# Patient Record
Sex: FEMALE | Race: WHITE | NOT HISPANIC OR LATINO | Employment: OTHER | URBAN - METROPOLITAN AREA
[De-identification: names, ages, dates, MRNs, and addresses within clinical notes are randomized per-mention and may not be internally consistent; named-entity substitution may affect disease eponyms.]

---

## 2019-04-26 ENCOUNTER — HOSPITAL ENCOUNTER (OUTPATIENT)
Dept: GENERAL RADIOLOGY | Facility: HOSPITAL | Age: 84
Discharge: HOME | End: 2019-04-26
Attending: INTERNAL MEDICINE

## 2019-12-05 ENCOUNTER — HOSPITAL ENCOUNTER (OUTPATIENT)
Dept: GENERAL RADIOLOGY | Facility: HOSPITAL | Age: 84
Discharge: HOME | End: 2019-12-05
Attending: INTERNAL MEDICINE

## 2020-10-05 ENCOUNTER — HOSPITAL ENCOUNTER (OUTPATIENT)
Dept: GENERAL RADIOLOGY | Facility: HOSPITAL | Age: 85
Discharge: HOME | End: 2020-10-05
Attending: INTERNAL MEDICINE

## 2021-05-23 ENCOUNTER — TRANSCRIBE ORDERS (OUTPATIENT)
Dept: ADMINISTRATIVE | Facility: HOSPITAL | Age: 86
End: 2021-05-23

## 2021-05-23 DIAGNOSIS — K74.60 HEPATIC CIRRHOSIS, UNSPECIFIED HEPATIC CIRRHOSIS TYPE, UNSPECIFIED WHETHER ASCITES PRESENT (HCC): Primary | ICD-10-CM

## 2021-06-28 ENCOUNTER — HOSPITAL ENCOUNTER (OUTPATIENT)
Dept: ULTRASOUND IMAGING | Facility: HOSPITAL | Age: 86
Discharge: HOME | End: 2021-06-28
Admitting: INTERNAL MEDICINE

## 2021-06-28 DIAGNOSIS — K74.60 HEPATIC CIRRHOSIS, UNSPECIFIED HEPATIC CIRRHOSIS TYPE, UNSPECIFIED WHETHER ASCITES PRESENT (HCC): ICD-10-CM

## 2021-06-28 PROCEDURE — 76705 ECHO EXAM OF ABDOMEN: CPT

## 2021-11-08 ENCOUNTER — TRANSCRIBE ORDERS (OUTPATIENT)
Dept: ADMINISTRATIVE | Facility: HOSPITAL | Age: 86
End: 2021-11-08

## 2021-11-08 DIAGNOSIS — K74.69 OTHER CIRRHOSIS OF LIVER (HCC): Primary | ICD-10-CM

## 2022-02-21 ENCOUNTER — HOSPITAL ENCOUNTER (OUTPATIENT)
Dept: ULTRASOUND IMAGING | Facility: HOSPITAL | Age: 87
Discharge: HOME | End: 2022-02-21
Admitting: INTERNAL MEDICINE

## 2022-02-21 DIAGNOSIS — K74.69 OTHER CIRRHOSIS OF LIVER: ICD-10-CM

## 2022-02-21 PROCEDURE — 76705 ECHO EXAM OF ABDOMEN: CPT

## 2023-01-01 ENCOUNTER — APPOINTMENT (OUTPATIENT)
Dept: GENERAL RADIOLOGY | Facility: HOSPITAL | Age: 88
End: 2023-01-01
Payer: COMMERCIAL

## 2023-01-01 ENCOUNTER — HOSPITAL ENCOUNTER (EMERGENCY)
Facility: HOSPITAL | Age: 88
End: 2023-11-09
Attending: EMERGENCY MEDICINE
Payer: COMMERCIAL

## 2023-01-01 ENCOUNTER — APPOINTMENT (OUTPATIENT)
Dept: CT IMAGING | Facility: HOSPITAL | Age: 88
End: 2023-01-01
Payer: COMMERCIAL

## 2023-01-01 VITALS
DIASTOLIC BLOOD PRESSURE: 20 MMHG | RESPIRATION RATE: 20 BRPM | OXYGEN SATURATION: 98 % | WEIGHT: 173.28 LBS | SYSTOLIC BLOOD PRESSURE: 57 MMHG | TEMPERATURE: 95.5 F

## 2023-01-01 DIAGNOSIS — K63.1 BOWEL PERFORATION: ICD-10-CM

## 2023-01-01 DIAGNOSIS — I46.9 CARDIAC ARREST: Primary | ICD-10-CM

## 2023-01-01 LAB
ALBUMIN SERPL-MCNC: 1.5 G/DL (ref 3.5–5.2)
ALBUMIN SERPL-MCNC: 1.8 G/DL (ref 3.5–5.2)
ALBUMIN/GLOB SERPL: 0.6 G/DL
ALBUMIN/GLOB SERPL: 0.8 G/DL
ALP SERPL-CCNC: 62 U/L (ref 39–117)
ALP SERPL-CCNC: 76 U/L (ref 39–117)
ALT SERPL W P-5'-P-CCNC: 1310 U/L (ref 1–33)
ALT SERPL W P-5'-P-CCNC: 453 U/L (ref 1–33)
ANION GAP SERPL CALCULATED.3IONS-SCNC: 32.5 MMOL/L (ref 5–15)
ANION GAP SERPL CALCULATED.3IONS-SCNC: 38.6 MMOL/L (ref 5–15)
ANISOCYTOSIS BLD QL: ABNORMAL
ARTERIAL PATENCY WRIST A: ABNORMAL
ARTERIAL PATENCY WRIST A: ABNORMAL
AST SERPL-CCNC: 1020 U/L (ref 1–32)
AST SERPL-CCNC: >7000 U/L (ref 1–32)
BASE EXCESS BLDA CALC-SCNC: -14.6 MMOL/L (ref -2–2)
BASE EXCESS BLDA CALC-SCNC: -19 MMOL/L (ref -2–2)
BDY SITE: ABNORMAL
BDY SITE: ABNORMAL
BILIRUB SERPL-MCNC: 0.5 MG/DL (ref 0–1.2)
BILIRUB SERPL-MCNC: 0.7 MG/DL (ref 0–1.2)
BUN SERPL-MCNC: 160 MG/DL (ref 8–23)
BUN SERPL-MCNC: >112 MG/DL (ref 8–23)
BUN/CREAT SERPL: 24.6 (ref 7–25)
BUN/CREAT SERPL: ABNORMAL
BURR CELLS BLD QL SMEAR: ABNORMAL
CA-I BLDA-SCNC: 0.97 MMOL/L (ref 1.13–1.32)
CA-I BLDA-SCNC: 1.29 MMOL/L (ref 1.13–1.32)
CALCIUM SPEC-SCNC: 7.6 MG/DL (ref 8.6–10.5)
CALCIUM SPEC-SCNC: 8.3 MG/DL (ref 8.6–10.5)
CHLORIDE BLDA-SCNC: 85 MMOL/L (ref 98–106)
CHLORIDE BLDA-SCNC: 96 MMOL/L (ref 98–106)
CHLORIDE SERPL-SCNC: 78 MMOL/L (ref 98–107)
CHLORIDE SERPL-SCNC: 95 MMOL/L (ref 98–107)
CLUMPED PLATELETS: PRESENT
CO2 SERPL-SCNC: 10.4 MMOL/L (ref 22–29)
CO2 SERPL-SCNC: 12.5 MMOL/L (ref 22–29)
COHGB MFR BLD: 0.1 % (ref 0–1.5)
COHGB MFR BLD: 0.3 % (ref 0–1.5)
CREAT SERPL-MCNC: 6.11 MG/DL (ref 0.57–1)
CREAT SERPL-MCNC: 6.5 MG/DL (ref 0.57–1)
D-LACTATE SERPL-SCNC: 14.8 MMOL/L (ref 0.5–2)
DEPRECATED RDW RBC AUTO: 51 FL (ref 37–54)
EGFRCR SERPLBLD CKD-EPI 2021: 5.7 ML/MIN/1.73
EGFRCR SERPLBLD CKD-EPI 2021: 6.2 ML/MIN/1.73
EOSINOPHIL # BLD MANUAL: 0.18 10*3/MM3 (ref 0–0.4)
EOSINOPHIL NFR BLD MANUAL: 1 % (ref 0.3–6.2)
ERYTHROCYTE [DISTWIDTH] IN BLOOD BY AUTOMATED COUNT: 15.7 % (ref 12.3–15.4)
FHHB: 0.6 % (ref 0–5)
FHHB: 2.7 % (ref 0–5)
GEN 5 2HR TROPONIN T REFLEX: 1726 NG/L
GLOBULIN UR ELPH-MCNC: 1.8 GM/DL
GLOBULIN UR ELPH-MCNC: 2.8 GM/DL
GLUCOSE BLDA-MCNC: 157 MG/DL (ref 65–99)
GLUCOSE BLDA-MCNC: 460 MG/DL (ref 65–99)
GLUCOSE BLDC GLUCOMTR-MCNC: 146 MG/DL (ref 70–99)
GLUCOSE SERPL-MCNC: 187 MG/DL (ref 65–99)
GLUCOSE SERPL-MCNC: 480 MG/DL (ref 65–99)
HCO3 BLDA-SCNC: 12.4 MMOL/L (ref 22–26)
HCO3 BLDA-SCNC: 13.4 MMOL/L (ref 22–26)
HCT VFR BLD AUTO: 45.3 % (ref 34–46.6)
HGB BLD-MCNC: 14.7 G/DL (ref 12–15.9)
HGB BLDA-MCNC: 10.4 G/DL (ref 11.7–14.6)
HGB BLDA-MCNC: 12.8 G/DL (ref 11.7–14.6)
HOLD SPECIMEN: NORMAL
HOLD SPECIMEN: NORMAL
INHALED O2 CONCENTRATION: 100 %
INHALED O2 CONCENTRATION: 100 %
LACTATE BLDA-SCNC: 14.61 MMOL/L (ref 0.5–2)
LACTATE BLDA-SCNC: 16.48 MMOL/L (ref 0.5–2)
LARGE PLATELETS: ABNORMAL
LIPASE SERPL-CCNC: 55 U/L (ref 13–60)
LYMPHOCYTES # BLD MANUAL: 2.86 10*3/MM3 (ref 0.7–3.1)
MAGNESIUM SERPL-MCNC: 3 MG/DL (ref 1.6–2.4)
MCH RBC QN AUTO: 28.7 PG (ref 26.6–33)
MCHC RBC AUTO-ENTMCNC: 32.5 G/DL (ref 31.5–35.7)
MCV RBC AUTO: 88.5 FL (ref 79–97)
METHGB BLD QL: 0.2 % (ref 0–1.5)
METHGB BLD QL: 0.2 % (ref 0–1.5)
MODALITY: ABNORMAL
MODALITY: ABNORMAL
NEUTROPHILS # BLD AUTO: 14.5 10*3/MM3 (ref 1.7–7)
NEUTROPHILS NFR BLD MANUAL: 80 % (ref 42.7–76)
NEUTS BAND NFR BLD MANUAL: 1 % (ref 0–5)
NEUTS VAC BLD QL SMEAR: ABNORMAL
NOTE: ABNORMAL
NOTE: ABNORMAL
NRBC SPEC MANUAL: 4 /100 WBC (ref 0–0.2)
NT-PROBNP SERPL-MCNC: ABNORMAL PG/ML (ref 0–1800)
OXYHGB MFR BLDV: 96.8 % (ref 94–99)
OXYHGB MFR BLDV: 99.1 % (ref 94–99)
PCO2 BLDA: 39 MM HG (ref 35–45)
PCO2 BLDA: 55.8 MM HG (ref 35–45)
PH BLDA: 6.96 PH UNITS (ref 7.35–7.45)
PH BLDA: 7.16 PH UNITS (ref 7.35–7.45)
PLATELET # BLD AUTO: 159 10*3/MM3 (ref 140–450)
PMV BLD AUTO: 13.9 FL (ref 6–12)
PO2 BLD: 160 MM[HG] (ref 0–500)
PO2 BLD: 310 MM[HG] (ref 0–500)
PO2 BLDA: 159.8 MM HG (ref 80–100)
PO2 BLDA: 309.9 MM HG (ref 80–100)
POTASSIUM BLDA-SCNC: 5.69 MMOL/L (ref 3.5–5)
POTASSIUM BLDA-SCNC: 6.1 MMOL/L (ref 3.5–5)
POTASSIUM SERPL-SCNC: 5.6 MMOL/L (ref 3.5–5.2)
POTASSIUM SERPL-SCNC: ABNORMAL MMOL/L
PROMYELOCYTES NFR BLD MANUAL: 2 % (ref 0–0)
PROT SERPL-MCNC: 3.3 G/DL (ref 6–8.5)
PROT SERPL-MCNC: 4.6 G/DL (ref 6–8.5)
RBC # BLD AUTO: 5.12 10*6/MM3 (ref 3.77–5.28)
SAO2 % BLDCOA: 97.3 % (ref 95–99)
SAO2 % BLDCOA: 99.4 % (ref 95–99)
SCAN SLIDE: NORMAL
SMALL PLATELETS BLD QL SMEAR: ADEQUATE
SODIUM BLDA-SCNC: 127.5 MMOL/L (ref 136–146)
SODIUM BLDA-SCNC: 138.8 MMOL/L (ref 136–146)
SODIUM SERPL-SCNC: 123 MMOL/L (ref 136–145)
SODIUM SERPL-SCNC: 144 MMOL/L (ref 136–145)
TROPONIN T DELTA: -307 NG/L
TROPONIN T SERPL HS-MCNC: 2033 NG/L
VARIANT LYMPHS NFR BLD MANUAL: 16 % (ref 19.6–45.3)
WBC NRBC COR # BLD: 17.9 10*3/MM3 (ref 3.4–10.8)
WHOLE BLOOD HOLD COAG: NORMAL
WHOLE BLOOD HOLD SPECIMEN: NORMAL

## 2023-01-01 PROCEDURE — 96376 TX/PRO/DX INJ SAME DRUG ADON: CPT

## 2023-01-01 PROCEDURE — 63710000001 INSULIN REGULAR HUMAN PER 5 UNITS

## 2023-01-01 PROCEDURE — 83605 ASSAY OF LACTIC ACID: CPT | Performed by: EMERGENCY MEDICINE

## 2023-01-01 PROCEDURE — 99291 CRITICAL CARE FIRST HOUR: CPT | Performed by: INTERNAL MEDICINE

## 2023-01-01 PROCEDURE — 82948 REAGENT STRIP/BLOOD GLUCOSE: CPT

## 2023-01-01 PROCEDURE — 76937 US GUIDE VASCULAR ACCESS: CPT | Performed by: NURSE PRACTITIONER

## 2023-01-01 PROCEDURE — 25010000002 EPINEPHRINE 1 MG/10ML SOLUTION PREFILLED SYRINGE: Performed by: EMERGENCY MEDICINE

## 2023-01-01 PROCEDURE — 71045 X-RAY EXAM CHEST 1 VIEW: CPT

## 2023-01-01 PROCEDURE — 85007 BL SMEAR W/DIFF WBC COUNT: CPT | Performed by: EMERGENCY MEDICINE

## 2023-01-01 PROCEDURE — 82375 ASSAY CARBOXYHB QUANT: CPT | Performed by: EMERGENCY MEDICINE

## 2023-01-01 PROCEDURE — 80053 COMPREHEN METABOLIC PANEL: CPT | Performed by: EMERGENCY MEDICINE

## 2023-01-01 PROCEDURE — 92950 HEART/LUNG RESUSCITATION CPR: CPT

## 2023-01-01 PROCEDURE — 94002 VENT MGMT INPAT INIT DAY: CPT

## 2023-01-01 PROCEDURE — 96375 TX/PRO/DX INJ NEW DRUG ADDON: CPT

## 2023-01-01 PROCEDURE — 96365 THER/PROPH/DIAG IV INF INIT: CPT

## 2023-01-01 PROCEDURE — 25810000003 SODIUM CHLORIDE 0.9 % SOLUTION: Performed by: EMERGENCY MEDICINE

## 2023-01-01 PROCEDURE — 74176 CT ABD & PELVIS W/O CONTRAST: CPT

## 2023-01-01 PROCEDURE — 83735 ASSAY OF MAGNESIUM: CPT | Performed by: EMERGENCY MEDICINE

## 2023-01-01 PROCEDURE — 82805 BLOOD GASES W/O2 SATURATION: CPT | Performed by: EMERGENCY MEDICINE

## 2023-01-01 PROCEDURE — C1751 CATH, INF, PER/CENT/MIDLINE: HCPCS

## 2023-01-01 PROCEDURE — 0 DEXTROSE 5 % SOLUTION: Performed by: INTERNAL MEDICINE

## 2023-01-01 PROCEDURE — 25010000002 AMIODARONE IN DEXTROSE 5% 360-4.14 MG/200ML-% SOLUTION: Performed by: EMERGENCY MEDICINE

## 2023-01-01 PROCEDURE — 84484 ASSAY OF TROPONIN QUANT: CPT | Performed by: EMERGENCY MEDICINE

## 2023-01-01 PROCEDURE — 36415 COLL VENOUS BLD VENIPUNCTURE: CPT

## 2023-01-01 PROCEDURE — 87040 BLOOD CULTURE FOR BACTERIA: CPT | Performed by: EMERGENCY MEDICINE

## 2023-01-01 PROCEDURE — 25010000002 EPINEPHRINE 1 MG/ML SOLUTION: Performed by: EMERGENCY MEDICINE

## 2023-01-01 PROCEDURE — 71250 CT THORAX DX C-: CPT

## 2023-01-01 PROCEDURE — 94799 UNLISTED PULMONARY SVC/PX: CPT

## 2023-01-01 PROCEDURE — 36600 WITHDRAWAL OF ARTERIAL BLOOD: CPT | Performed by: EMERGENCY MEDICINE

## 2023-01-01 PROCEDURE — 85025 COMPLETE CBC W/AUTO DIFF WBC: CPT | Performed by: EMERGENCY MEDICINE

## 2023-01-01 PROCEDURE — 83690 ASSAY OF LIPASE: CPT | Performed by: EMERGENCY MEDICINE

## 2023-01-01 PROCEDURE — 25010000002 PIPERACILLIN SOD-TAZOBACTAM PER 1 G: Performed by: EMERGENCY MEDICINE

## 2023-01-01 PROCEDURE — 31500 INSERT EMERGENCY AIRWAY: CPT

## 2023-01-01 PROCEDURE — 83050 HGB METHEMOGLOBIN QUAN: CPT | Performed by: EMERGENCY MEDICINE

## 2023-01-01 PROCEDURE — 25010000002 LORAZEPAM PER 2 MG: Performed by: EMERGENCY MEDICINE

## 2023-01-01 PROCEDURE — 36556 INSERT NON-TUNNEL CV CATH: CPT | Performed by: NURSE PRACTITIONER

## 2023-01-01 PROCEDURE — 83880 ASSAY OF NATRIURETIC PEPTIDE: CPT | Performed by: EMERGENCY MEDICINE

## 2023-01-01 PROCEDURE — 99291 CRITICAL CARE FIRST HOUR: CPT

## 2023-01-01 PROCEDURE — 96368 THER/DIAG CONCURRENT INF: CPT

## 2023-01-01 PROCEDURE — 70450 CT HEAD/BRAIN W/O DYE: CPT

## 2023-01-01 PROCEDURE — 96366 THER/PROPH/DIAG IV INF ADDON: CPT

## 2023-01-01 PROCEDURE — 99292 CRITICAL CARE ADDL 30 MIN: CPT | Performed by: INTERNAL MEDICINE

## 2023-01-01 PROCEDURE — 25010000002 ATROPINE SULFATE: Performed by: EMERGENCY MEDICINE

## 2023-01-01 PROCEDURE — 25010000002 AMIODARONE PER 30 MG: Performed by: EMERGENCY MEDICINE

## 2023-01-01 RX ORDER — LORAZEPAM 2 MG/ML
1 INJECTION INTRAMUSCULAR ONCE
Status: COMPLETED | OUTPATIENT
Start: 2023-01-01 | End: 2023-01-01

## 2023-01-01 RX ORDER — DEXTROSE MONOHYDRATE 25 G/50ML
25 INJECTION, SOLUTION INTRAVENOUS ONCE
Status: COMPLETED | OUTPATIENT
Start: 2023-01-01 | End: 2023-01-01

## 2023-01-01 RX ORDER — CALCIUM GLUCONATE 20 MG/ML
1000 INJECTION, SOLUTION INTRAVENOUS ONCE
Status: DISCONTINUED | OUTPATIENT
Start: 2023-01-01 | End: 2023-11-10 | Stop reason: HOSPADM

## 2023-01-01 RX ORDER — DEXTROSE MONOHYDRATE 25 G/50ML
INJECTION, SOLUTION INTRAVENOUS
Status: COMPLETED | OUTPATIENT
Start: 2023-01-01 | End: 2023-01-01

## 2023-01-01 RX ORDER — VANCOMYCIN/0.9 % SOD CHLORIDE 1.5G/250ML
20 PLASTIC BAG, INJECTION (ML) INTRAVENOUS ONCE
Status: DISCONTINUED | OUTPATIENT
Start: 2023-01-01 | End: 2023-11-10 | Stop reason: HOSPADM

## 2023-01-01 RX ORDER — DEXMEDETOMIDINE HYDROCHLORIDE 4 UG/ML
.2-1.5 INJECTION, SOLUTION INTRAVENOUS
Status: DISCONTINUED | OUTPATIENT
Start: 2023-01-01 | End: 2023-11-10 | Stop reason: HOSPADM

## 2023-01-01 RX ORDER — DEXMEDETOMIDINE HYDROCHLORIDE 4 UG/ML
INJECTION, SOLUTION INTRAVENOUS
Status: COMPLETED
Start: 2023-01-01 | End: 2023-01-01

## 2023-01-01 RX ORDER — SODIUM BICARBONATE IN D5W 150/1000ML
PLASTIC BAG, INJECTION (ML) INTRAVENOUS
Status: DISCONTINUED
Start: 2023-01-01 | End: 2023-11-10 | Stop reason: HOSPADM

## 2023-01-01 RX ORDER — ASPIRIN 81 MG/1
324 TABLET, CHEWABLE ORAL ONCE
Status: DISCONTINUED | OUTPATIENT
Start: 2023-01-01 | End: 2023-11-10 | Stop reason: HOSPADM

## 2023-01-01 RX ORDER — CALCIUM CHLORIDE 100 MG/ML
INJECTION INTRAVENOUS; INTRAVENTRICULAR
Status: COMPLETED | OUTPATIENT
Start: 2023-01-01 | End: 2023-01-01

## 2023-01-01 RX ORDER — SODIUM CHLORIDE 0.9 % (FLUSH) 0.9 %
10 SYRINGE (ML) INJECTION AS NEEDED
Status: DISCONTINUED | OUTPATIENT
Start: 2023-01-01 | End: 2023-11-10 | Stop reason: HOSPADM

## 2023-01-01 RX ADMIN — EPINEPHRINE 1 MG: 0.1 INJECTION INTRAVENOUS at 14:33

## 2023-01-01 RX ADMIN — SODIUM CHLORIDE 1000 ML: 9 INJECTION, SOLUTION INTRAVENOUS at 16:00

## 2023-01-01 RX ADMIN — SODIUM CHLORIDE 2000 ML: 9 INJECTION, SOLUTION INTRAVENOUS at 14:55

## 2023-01-01 RX ADMIN — DEXTROSE MONOHYDRATE 25 G: 25 INJECTION, SOLUTION INTRAVENOUS at 14:19

## 2023-01-01 RX ADMIN — AMIODARONE HYDROCHLORIDE 1 MG/MIN: 1.8 INJECTION, SOLUTION INTRAVENOUS at 14:03

## 2023-01-01 RX ADMIN — INSULIN HUMAN 10 UNITS: 100 INJECTION, SOLUTION PARENTERAL at 16:14

## 2023-01-01 RX ADMIN — ATROPINE SULFATE 1 MG: 0.1 INJECTION INTRAVENOUS at 14:11

## 2023-01-01 RX ADMIN — SODIUM CHLORIDE 1000 ML: 9 INJECTION, SOLUTION INTRAVENOUS at 13:45

## 2023-01-01 RX ADMIN — EPINEPHRINE 1 MG: 0.1 INJECTION INTRAVENOUS at 14:39

## 2023-01-01 RX ADMIN — EPINEPHRINE 1 MG: 0.1 INJECTION INTRAVENOUS at 16:11

## 2023-01-01 RX ADMIN — DEXMEDETOMIDINE HYDROCHLORIDE 0.2 MCG/KG/HR: 4 INJECTION, SOLUTION INTRAVENOUS at 13:57

## 2023-01-01 RX ADMIN — SODIUM BICARBONATE 50 MEQ: 84 INJECTION INTRAVENOUS at 16:22

## 2023-01-01 RX ADMIN — ATROPINE SULFATE 1 MG: 0.1 INJECTION INTRAVENOUS at 14:01

## 2023-01-01 RX ADMIN — SODIUM BICARBONATE 50 MEQ: 84 INJECTION INTRAVENOUS at 16:12

## 2023-01-01 RX ADMIN — EPINEPHRINE 1 MG: 0.1 INJECTION INTRAVENOUS at 14:36

## 2023-01-01 RX ADMIN — DEXTROSE MONOHYDRATE 150 MG: 50 INJECTION, SOLUTION INTRAVENOUS at 13:51

## 2023-01-01 RX ADMIN — PIPERACILLIN AND TAZOBACTAM 3.38 G: 3; .375 INJECTION, POWDER, LYOPHILIZED, FOR SOLUTION INTRAVENOUS at 15:19

## 2023-01-01 RX ADMIN — DEXTROSE MONOHYDRATE 25 G: 25 INJECTION, SOLUTION INTRAVENOUS at 13:45

## 2023-01-01 RX ADMIN — EPINEPHRINE 1 MG: 0.1 INJECTION INTRAVENOUS at 13:36

## 2023-01-01 RX ADMIN — LORAZEPAM 1 MG: 2 INJECTION INTRAMUSCULAR; INTRAVENOUS at 13:42

## 2023-01-01 RX ADMIN — Medication 0.03 MCG/KG/MIN: at 14:49

## 2023-01-01 RX ADMIN — SODIUM BICARBONATE 50 MEQ: 84 INJECTION INTRAVENOUS at 14:37

## 2023-01-01 RX ADMIN — SODIUM BICARBONATE 50 MEQ: 84 INJECTION INTRAVENOUS at 13:45

## 2023-01-01 RX ADMIN — CALCIUM CHLORIDE INJECTION 1 G: 100 INJECTION, SOLUTION INTRAVENOUS at 16:12

## 2023-11-09 PROBLEM — I46.9 CARDIAC ARREST: Status: ACTIVE | Noted: 2023-01-01

## 2023-11-09 NOTE — PROCEDURES
CENTRAL LINE PLACEMENT NOTE  Indication: shock  Consent obtained: yes,  bedside  Time out: performed with liu RIVERO RN at bedside prior to procedure    Procedure:   The patient was placed in the supine position and the skin over the patient's Right internal jugular vein was prepped with chlorhexidine. The patient was draped with full body drape in sterile fashion/sterile procedure was used.  Large-bore needle was used to cannulate the vessel with return of dark blood.  The guidewire was inserted into the needle using the Seldinger technique then needle was removed. Ultrasound was used to confirm the placement of the wire in the vein. A small nick was made in the skin and vessel was dilated with dilator. A triple-lumen catheter was then placed into the vessel over the wire and the wire was removed. RN was notified of guidewire removal. All 3 ports freely flush and draw.  The catheter was securely fastened to the skin with suture. Next a sterile dressing was placed and a CXR ordered to confirm positioning of the line.     The patient tolerated the procedure well  Complications: no immediate complications noted    Electronically signed by MADI De Los Santos 11/09/23 16:44 EST

## 2023-11-09 NOTE — CONSULTS
Pulmonary / Critical Care Consult Note      Patient Name: Yaneli Elliott  : 1935  MRN: [id number]  Primary Care Physician:  Rosalio Calzada MD  Referring Physician: Michel Yap MD  Date of admission: 2023    Subjective   Subjective     Reason for Consult/ Chief Complaint: Cardiac arrest    HPI:  Yaneli Elliott is a 88 y.o. female with no reported past medical history on file presented to the ED via EMS for unresponsiveness.   is bedside he reports that she has been having abdominal pain for approximately 5 to 6 days unable to eat or drink anything.  Today she became less responsive, he called EMS.  When patient arrived to the ED she was agonal he breathing, no pulse, received 2 rounds of CPR and was placed on epinephrine drip.  Laboratory findings showed pH 7.1 5/39/309/13, lactic acid was 14, potassium 6.1 on ABG.  CT scan of the abdomen showed free air concern for small bowel pneumatosis notable.  Proximal and mid small bowel dilation and wall thickening concerning for enteritis versus ischemic bowel.  On exam patient was critically on mechanical ventilator, she was not on sedation, did not wake up or respond to painful stimuli.  She was on epinephrine at 0.5 mcg/kg/min.  Central line was placed in the emergency room.  She was given IV fluid resuscitation, and treated with sodium bicarb calcium and insulin for hyperkalemia.    Review of Systems  Unable to obtain      Personal History     Cannot obtain medical history as patient is intubated and unresponsive on mechanical ventilation    Cannot obtain social history as patient is intubated and unresponsive on mechanical ventilation    Cannot obtain surgical history as patient is intubated and unresponsive on mechanical ventilation    Cannot obtain family history as patient is intubated and unresponsive on mechanical ventilation    Home Medications:   Cannot obtain as patient is intubated and unresponsive on mechanical  ventilation    Allergies:  Not on File    Objective    Objective     Vitals:   Temp:  [95.5 °F (35.3 °C)] 95.5 °F (35.3 °C)  Heart Rate:  [0-124] 36  Resp:  [20] 20  BP: ()/() 97/44  FiO2 (%):  [80 %-100 %] 100 %    Physical Exam:  Vital Signs Reviewed   General: Critically ill on mechanical ventilator  HEENT:  PERRL, EOMI. ET tube in place  Neck:  Supple, no JVD, no thyromegaly  Chest:  good aeration, crackles and rhonchi bilaterally, tympanic to percussion bilaterally, no work of breathing noted  CV: RRR, no MGR, pulses 2+, equal.  Abd:  Soft, NT, ND, + BS, no HSM  EXT:  no clubbing, no cyanosis, no edema  Neuro:  A&Ox0, unresponsive  Skin: No rashes or lesions noted. Extensive mottling diffuse    Result Review    Result Review:  I have personally reviewed the results from the time of this admission to 11/9/2023 17:16 EST and agree with these findings:  [x]  Laboratory  [x]  Microbiology  [x]  Radiology  [x]  EKG/Telemetry   [x]  Cardiology/Vascular   []  Pathology  []  Old records  []  Other:  Most notable findings include:       Lab 11/09/23  1617 11/09/23  1419 11/09/23  1356   WBC  --   --  17.90*   HEMOGLOBIN  --   --  14.7   HEMATOCRIT  --   --  45.3   PLATELETS  --   --  159   SODIUM  --  123*  --    SODIUM, ARTERIAL 138.8  --  127.5*   CHLORIDE  --  78*  --    CO2  --  12.5*  --    BUN  --  160*  --    CREATININE  --  6.50*  --    GLUCOSE  --  480*  --    GLUCOSE, ARTERIAL 157*  --  460*   CALCIUM  --  7.6*  --    TOTAL PROTEIN  --  4.6*  --    ALBUMIN  --  1.8*  --    GLOBULIN  --  2.8  --      XR Chest 1 View    Result Date: 11/9/2023    1. Lines and support tubes are in good position.  There is no pneumothorax. 2. Right perihilar densities felt to represent subsegmental atelectasis.       MARIA T MILNER MD       Electronically Signed and Approved By: MARIA T MILNER MD on 11/09/2023 at 16:48             CT Abdomen Pelvis Without Contrast    Result Date: 11/9/2023    1. Free air and small bowel  pneumatosis.  The proximal and mid small bowel is mildly dilated with wall thickening.  This could be due to an enteritis or ischemic bowel.  This is likely the source for the perforation. 2. Diffuse colonic diverticulosis without evidence of acute diverticulitis. 3. Mild abdominal and pelvic ascites. 4. Cirrhotic liver. 5. Cholelithiasis. 6. Bilateral lower lobe airspace disease felt to represent a combination of atelectasis and pneumonia. 7. Additional findings as described above. 8. The abnormal results were discussed with Dr. Marely MD by telephone.     MARIA T MILNER MD       Electronically Signed and Approved By: MARIA T MILNER MD on 11/09/2023 at 16:14             CT Chest Without Contrast Diagnostic    Result Date: 11/9/2023     1. Multiple bilateral anterior rib fractures.  No pneumothorax  2. Airspace consolidation in the lower lobes may represent atelectasis, aspiration, or pneumonia.  3. Mild interlobular septal thickening in the apices and bases compatible with mild interstitial edema.  Ground-glass opacities in the upper lobes may also be edema, or pneumonia  4. Calcific coronary atherosclerosis  5. Cirrhosis with upper abdominal ascites  6. Cholelithiasis, right renal atrophy, and 2.5 cm hyperattenuating right renal lesion      TANISHA COUGHLIN MD       Electronically Signed and Approved By: TANISHA COUGHLIN MD on 11/09/2023 at 16:09             CT Head Without Contrast    Result Date: 11/9/2023    1. Small subacute to chronic cortical infarct in the superior left frontal parietal lobe. 2. Small chronic infarct in the anterolateral right frontal lobe 3. No intracranial hemorrhage 4. Mild-to-moderate small vessel ischemic changes in the white matter     Paco Jaquez M.D.       Electronically Signed and Approved By: Paco Jaquez M.D. on 11/09/2023 at 16:01             XR Chest 1 View    Result Date: 11/9/2023    1. Tubes and lines in appropriate position 2. Left basilar airspace disease       Dennys Presley,  MD       Electronically Signed and Approved By: Dennys Presley MD on 11/09/2023 at 14:14                Assessment & Plan   Assessment / Plan     Active Hospital Problems:  Active Hospital Problems    Diagnosis     Cardiac arrest      Impression:  Cardiac arrest  Combined septic and cardiogenic shock, present on admission  Pneumoperitoneum  Concern for small bowel perforation  Concern for ischemic bowel  Acute hypoxic respiratory failure requiring mechanical ventilation  Lactic acidosis, clinically significant  Life-threatening hyperkalemia  Acute renal failure, likely ATN  Transaminitis  Elevated troponin  Leukocytosis     Plan:  -Mechanical ventilator, AC 24/450/8 +, titrate FiO2  -Continue IV fluid resuscitation LR x4 L  -Treat for hyperkalemia with calcium, insulin and dextrose  -Initiate sodium bicarb drip 150 mEq at 150  -We will place emergent central line right IJ  -Obtain stat CT head chest abdomen pelvis  -CT of the abdomen pelvis shows free air in the abdomen and small bowel pneumatosis concerning for bowel perforation.Given her advanced age, multiple comorbidities and profound shock she is not a surgical candidate  -Recheck ABG with electrolytes  -Trend LFTs  -General surgery consulted, appreciate assistance     Had extensive discussion with  at bedside, patient has had 3 episodes of cardiac arrest, she is on epinephrine drip, CT scan of the abdomen pelvis reveals pneumoperitoneum concern for perforated small bowel, concerning for ischemic colitis.      He has agreed to make her DNR at this time.  Pastoral services at bedside    DVT prophylaxis:  No DVT prophylaxis order currently exists.     Code Status and Medical Interventions:   Ordered at: 11/09/23 1626     Code Status (Patient has no pulse and is not breathing):    No CPR (Do Not Attempt to Resuscitate)     Medical Interventions (Patient has pulse or is breathing):    Full Support         Patient is critically ill with cardiac arrest, bowel  perforation, septic shock, acute kidney injury, lactic acidosis, life-threatening hyperkalemia. We have personally reviewed all pertinent labs, imaging, microbiology and documentation. We have discussed care with the primary service as well as at multidisciplinary critical care rounds with the bedside nurse, respiratory therapist, pharmacist and all other ancillary services.106 minutes of critical care time was spent managing this patient, excluding procedures. Of this time, I spent 86 minutes in accordance with split shared billing.    I, MADI Mancera, spent 20 minutes critical care time in accordance to split shared billing.    Electronically signed by Pablito Olguin MD, 11/09/23, 5:17 PM EST.

## 2023-11-09 NOTE — ED PROVIDER NOTES
"Time: 1:52 PM EST  Date of encounter:  11/9/2023  Independent Historian/Clinical History and Information was obtained by:       History is limited by: Acuity of Condition    Chief Complaint: Abdominal pain, unconscious      History of Present Illness:  Patient is a 88 y.o. year old female who presents to the emergency department for evaluation of 7 days of abdominal pain.  Patient's  gives the entire history.  Patient not had nausea and vomiting \"a little\".  Denies any hematemesis.  Denies diarrhea.  Afebrile.  EMS called as patient was unresponsive today.  Has not eaten anything aside from Jell-O in several days.  Per EMS patient profoundly hypoxic and minimally responsive in route.    HPI    Patient Care Team  Primary Care Provider: Rosalio Calzada MD    Past Medical History:     Not on File  No past medical history on file.  No past surgical history on file.  No family history on file.    Home Medications:  Prior to Admission medications    Not on File        Social History:          Review of Systems:  Review of Systems   Unable to perform ROS: Patient unresponsive        Physical Exam:  BP (!) 57/20   Pulse (!) 0   Temp 95.5 °F (35.3 °C) (Bladder)   Resp 20   Wt 78.6 kg (173 lb 4.5 oz)   SpO2 98%     Physical Exam  Nursing note reviewed.   Constitutional:       General: She is not in acute distress.     Appearance: She is toxic-appearing.      Comments: Patient unresponsive.  Agonal respirations only.   HENT:      Head: Normocephalic and atraumatic.      Mouth/Throat:      Comments: Dry mucous membranes  Eyes:      Pupils: Pupils are equal, round, and reactive to light.      Comments: Pupils fixed and dilated   Cardiovascular:      Rate and Rhythm: Bradycardia present.      Comments: Bradycardic rhythm with no palpable central pulse.  Pulmonary:      Effort: Bradypnea and respiratory distress present.      Breath sounds: Examination of the right-upper field reveals decreased breath sounds. " Examination of the left-upper field reveals decreased breath sounds. Examination of the right-middle field reveals decreased breath sounds. Examination of the left-middle field reveals decreased breath sounds. Examination of the right-lower field reveals decreased breath sounds. Examination of the left-lower field reveals decreased breath sounds.      Comments: Agonal respiratory effort with almost 0 ventilation.  Skin:     Comments: Diffusely mottled   Neurological:      GCS: GCS eye subscore is 1. GCS verbal subscore is 1. GCS motor subscore is 1.                  Procedures:  Intubation    Date/Time: 11/9/2023 1:55 PM    Performed by: Michel Yap MD  Authorized by: Michel Yap MD    Consent:     Consent obtained:  Emergent situation    Consent given by:  Spouse    Risks discussed:  Aspiration, brain injury and hypoxia    Alternatives discussed:  No treatment  Pre-procedure details:     Indications: cardio/pulmonary arrest      Patient status:  Unresponsive    Look externally: no concerns      Neck mobility: normal      Induction agents:  None    Paralytics:  None  Procedure details:     Preoxygenation:  Bag valve mask    CPR in progress: yes      Number of attempts:  1  Successful intubation attempt details:     Intubation method:  Oral    Intubation technique: video assisted      Laryngoscope blade:  Hypercurved    Bougie used: no      Grade view: I      Tube size (mm):  7.5    Tube type:  Cuffed    Tube visualized through cords: yes    Placement assessment:     ETT at teeth/gumline (cm):  20    Tube secured with:  ETT jackson    Breath sounds:  Equal    Placement verification: chest rise and colorimetric ETCO2      CXR findings:  Appropriate position  Post-procedure details:     Procedure completion:  Tolerated well, no immediate complications        Medical Decision Making:      Comorbidities that affect care:    Diverticulitis    External Notes reviewed:    None      The following orders were  placed and all results were independently analyzed by me:  Orders Placed This Encounter   Procedures    Intubation    Blood Culture - Blood,    Blood Culture - Blood,    XR Chest 1 View    CT Abdomen Pelvis Without Contrast    CT Head Without Contrast    CT Chest Without Contrast Diagnostic    XR Chest 1 View    Quinlan Draw    High Sensitivity Troponin T    Comprehensive Metabolic Panel    Lipase    BNP    Magnesium    CBC Auto Differential    ABG with Co-Ox and Electrolytes    Lactic Acid, Plasma    Scan Slide    Manual Differential    STAT Lactic Acid, Reflex    High Sensitivity Troponin T 2Hr    Urinalysis With Culture If Indicated - Urine, Clean Catch    ABG with Co-Ox and Electrolytes    Comprehensive Metabolic Panel    NPO Diet NPO Type: Strict NPO    Undress & Gown    Continuous Pulse Oximetry    Insert Indwelling Urinary Catheter    Assess Need for Indwelling Urinary Catheter - Follow Removal Protocol    Urinary Catheter Care    Target Arousal Level RASS -1 to -2    Code Status and Medical Interventions:    IP General Consult (Use specialty-specific consult if known)    Hospitalist (on-call MD unless specified)    Surgery (on-call MD unless specified)    Oxygen Therapy- Nasal Cannula; Titrate 1-6 LPM Per SpO2; 90 - 95%    POC Glucose Q1H    POC Glucose Once    Insert Peripheral IV    CBC & Differential    Green Top (Gel)    Lavender Top    Gold Top - SST    Light Blue Top       Medications Given in the Emergency Department:  Medications   sodium chloride 0.9 % flush 10 mL (has no administration in time range)   aspirin chewable tablet 324 mg (324 mg Oral Not Given 11/9/23 1445)   sodium bicarbonate injection 8.4% 50 mEq ( Intravenous Canceled Entry 11/9/23 1746)   dexmedetomidine (PRECEDEX) 400 mcg in 100 mL NS infusion (0 mcg/kg/hr × 78.6 kg Intravenous Stopped 11/9/23 1432)   atropine sulfate injection 1 mg ( Intravenous Canceled Entry 11/9/23 1730)   amiodarone 360 mg in 200 mL D5W infusion (0 mg/min  Intravenous Stopped 11/9/23 1654)   vancomycin IVPB 1500 mg in 0.9% NaCl (Premix) 500 mL ( Intravenous Canceled Entry 11/9/23 1746)   calcium gluconate 1000 Mg/50ml 0.675% NaCl IV SOLN (1,000 mg Intravenous Not Given 11/9/23 1749)   sodium bicarbonate injection 8.4% 50 mEq ( Intravenous Canceled Entry 11/9/23 1746)   EPINEPHrine 5 mg in 250 mL NS infusion (0 mcg/kg/min × 78.6 kg Intravenous Stopped 11/9/23 1654)   sodium bicarbonate injection 8.4% 50 mEq ( Intravenous Canceled Entry 11/9/23 1746)   sodium bicarbonate 150 mEq/1000 mL D5W infusion (0 mEq Intravenous Stopped 11/9/23 1654)   Sodium Bicarbonate-Dextrose 150-5 MEQ/L-% infusion solution  - ADS Override Pull (  Canceled Entry 11/9/23 1746)   EPINEPHrine (ADRENALIN) injection (1 mg Intravenous Given 11/9/23 1336)   sodium chloride 0.9 % bolus 1,000 mL (0 mL Intravenous Stopped 11/9/23 1400)   LORazepam (ATIVAN) injection 1 mg (1 mg Intravenous Given 11/9/23 1342)   dextrose (D50W) (25 g/50 mL) IV injection 25 g (25 g Intravenous Given 11/9/23 1419)   sodium chloride 0.9 % bolus 2,000 mL (0 mL Intravenous Stopped 11/9/23 1525)   piperacillin-tazobactam (ZOSYN) 3.375 g/100 mL 0.9% NS IVPB (mbp) (0 g Intravenous Stopped 11/9/23 1549)   EPINEPHrine (ADRENALIN) injection (1 mg Intravenous Given 11/9/23 1439)   sodium bicarbonate injection 8.4% (50 mEq Intravenous Given 11/9/23 1437)   insulin regular (humuLIN R,novoLIN R) injection 7 Units (10 Units Intravenous Given 11/9/23 1614)   sodium bicarbonate injection 8.4% (50 mEq Intravenous Given 11/9/23 1612)   dextrose (D50W) (25 g/50 mL) IV injection (25 g Intravenous Given 11/9/23 1345)   EPINEPHrine (ADRENALIN) injection (1 mg Intravenous Given 11/9/23 1611)   calcium chloride injection (1 g Intravenous Given 11/9/23 1612)   atropine sulfate injection (1 mg Intravenous Given 11/9/23 1411)   sodium bicarbonate injection 8.4% (50 mEq Intravenous Given 11/9/23 1622)   sodium chloride 0.9 % bolus 1,000 mL (0 mL  Intravenous Stopped 23 1620)        ED Course:    ED Course as of 23 2319   Thu 2023   1359 CPR initiated once patient arrived at our facility.  Patient appeared to be in a sinus rhythm on first pulse check with heart rate in the 120s to 110s.  She did suddenly jump into a rapid tachyarrhythmia with heart rate in the 150.  She still had a pulse throughout this time.  We did cardiovert x1 with improvement in heart rate. [RP]   1617 Case discussed with Dr. Goldman for consultation. [RP]   1655 Patient is .  Time of death . [RP]      ED Course User Index  [RP] Michel Yap MD       Labs:    Lab Results (last 24 hours)       Procedure Component Value Units Date/Time    CBC & Differential [[id number]]  (Abnormal) Collected: 23    Specimen: Blood Updated: 23    Narrative:      The following orders were created for panel order CBC & Differential.  Procedure                               Abnormality         Status                     ---------                               -----------         ------                     CBC Auto Differential[[id number]]        Abnormal            Final result               Scan Slide[[id number]]                                       Final result                 Please view results for these tests on the individual orders.    CBC Auto Differential [[id number]]  (Abnormal) Collected: 23    Specimen: Blood Updated: 23     WBC 17.90 10*3/mm3      RBC 5.12 10*6/mm3      Hemoglobin 14.7 g/dL      Hematocrit 45.3 %      MCV 88.5 fL      MCH 28.7 pg      MCHC 32.5 g/dL      RDW 15.7 %      RDW-SD 51.0 fl      MPV 13.9 fL      Platelets 159 10*3/mm3     ABG with Co-Ox and Electrolytes [[id number]]  (Abnormal) Collected: 23    Specimen: Arterial Blood Updated: 23 1405     pH, Arterial 7.155 pH units      pCO2, Arterial 39.0 mm Hg      pO2, Arterial 309.9 mm Hg      HCO3, Arterial 13.4 mmol/L      Base  Excess, Arterial -14.6 mmol/L      O2 Saturation, Arterial 99.4 %      Hemoglobin, Blood Gas 12.8 g/dL      Carboxyhemoglobin 0.1 %      Methemoglobin 0.20 %      Oxyhemoglobin 99.1 %      FHHB 0.6 %      Adam's Test N/A     Note AC/VC, vT400, RR 20, P+5     Site Femoral Artery     Modality Adult Vent     FIO2 100 %      Sodium, Arterial 127.5 mmol/L      Potassium, Arterial 6.10 mmol/L      Ionized Calcium, Arterial 0.97 mmol/L      Chloride, Arterial 85 mmol/L      Glucose, Arterial 460 mg/dL      Lactate, Arterial 14.61 mmol/L      PO2/FIO2 310    Scan Slide [[id number]] Collected: 11/09/23 1356    Specimen: Blood Updated: 11/09/23 1424     Scan Slide --     Comment: See Manual Differential Results       Manual Differential [[id number]]  (Abnormal) Collected: 11/09/23 1356    Specimen: Blood Updated: 11/09/23 1424     Neutrophil % 80.0 %      Lymphocyte % 16.0 %      Eosinophil % 1.0 %      Bands %  1.0 %      Promyelocyte % 2.0 %      Neutrophils Absolute 14.50 10*3/mm3      Lymphocytes Absolute 2.86 10*3/mm3      Eosinophils Absolute 0.18 10*3/mm3      nRBC 4.0 /100 WBC      Anisocytosis Slight/1+     Crenated RBC's Slight/1+     Vacuolated Neutrophils Slight/1+     Platelet Estimate Adequate     Clumped Platelets Present     Large Platelets Slight/1+    Lactic Acid, Plasma [[id number]]  (Abnormal) Collected: 11/09/23 1357    Specimen: Blood Updated: 11/09/23 1435     Lactate 14.8 mmol/L     Blood Culture - Blood, Arm, Left [[id number]] Collected: 11/09/23 1357    Specimen: Blood from Arm, Left Updated: 11/09/23 1401    Blood Culture - Blood, Arm, Left [[id number]] Collected: 11/09/23 1357    Specimen: Blood from Arm, Left Updated: 11/09/23 1401    High Sensitivity Troponin T [[id number]]  (Abnormal) Collected: 11/09/23 1419    Specimen: Blood from Arm, Left Updated: 11/09/23 1450     HS Troponin T 2,033 ng/L      Comment: Specimen hemolyzed.  Results may be affected.       Narrative:      High Sensitive Troponin T  Reference Range:  <14.0 ng/L- Negative Female for AMI  <22.0 ng/L- Negative Male for AMI  >=14 - Abnormal Female indicating possible myocardial injury.  >=22 - Abnormal Male indicating possible myocardial injury.   Clinicians would have to utilize clinical acumen, EKG, Troponin, and serial changes to determine if it is an Acute Myocardial Infarction or myocardial injury due to an underlying chronic condition.         Comprehensive Metabolic Panel [[id number]]  (Abnormal) Collected: 11/09/23 1419    Specimen: Blood from Arm, Left Updated: 11/09/23 1449     Glucose 480 mg/dL       mg/dL      Creatinine 6.50 mg/dL      Sodium 123 mmol/L      Potassium --     Comment: Specimen hemolyzed.  Result may be falsely elevated.        Chloride 78 mmol/L      CO2 12.5 mmol/L      Calcium 7.6 mg/dL      Total Protein 4.6 g/dL      Albumin 1.8 g/dL      ALT (SGPT) 453 U/L      Comment: Specimen hemolyzed.  Result may  be falsely elevated.        AST (SGOT) 1,020 U/L      Comment: Specimen hemolyzed.  Result may be falsely elevated.        Alkaline Phosphatase 62 U/L      Total Bilirubin 0.7 mg/dL      Globulin 2.8 gm/dL      A/G Ratio 0.6 g/dL      BUN/Creatinine Ratio 24.6     Anion Gap 32.5 mmol/L      eGFR 5.7 mL/min/1.73      Comment: <15 Indicative of kidney failure       Narrative:      GFR Normal >60  Chronic Kidney Disease <60  Kidney Failure <15    The GFR formula is only valid for adults with stable renal function between ages 18 and 70.    Lipase [[id number]]  (Normal) Collected: 11/09/23 1419    Specimen: Blood from Arm, Left Updated: 11/09/23 1437     Lipase 55 U/L     BNP [[id number]]  (Abnormal) Collected: 11/09/23 1419    Specimen: Blood from Arm, Left Updated: 11/09/23 1435     proBNP 15,573.0 pg/mL     Narrative:      This assay is used as an aid in the diagnosis of individuals suspected of having heart failure. It can be used as an aid in the diagnosis of acute decompensated heart failure (ADHF) in patients  presenting with signs and symptoms of ADHF to the emergency department (ED). In addition, NT-proBNP of <300 pg/mL indicates ADHF is not likely.    Age Range Result Interpretation  NT-proBNP Concentration (pg/mL:      <50             Positive            >450                   Gray                 300-450                    Negative             <300    50-75           Positive            >900                  Gray                300-900                  Negative            <300      >75             Positive            >1800                  Gray                300-1800                  Negative            <300    Magnesium [[id number]]  (Abnormal) Collected: 11/09/23 1419    Specimen: Blood from Arm, Left Updated: 11/09/23 1439     Magnesium 3.0 mg/dL     ABG with Co-Ox and Electrolytes [[id number]]  (Abnormal) Collected: 11/09/23 1617    Specimen: Arterial Blood Updated: 11/09/23 1622     pH, Arterial 6.964 pH units      pCO2, Arterial 55.8 mm Hg      pO2, Arterial 159.8 mm Hg      HCO3, Arterial 12.4 mmol/L      Base Excess, Arterial -19.0 mmol/L      O2 Saturation, Arterial 97.3 %      Hemoglobin, Blood Gas 10.4 g/dL      Carboxyhemoglobin 0.3 %      Methemoglobin 0.20 %      Oxyhemoglobin 96.8 %      FHHB 2.7 %      Adam's Test N/A     Note AC/VC vT400, RR20, 100% P+5     Site Arterial: left femoral     Modality Adult Vent     FIO2 100 %      Sodium, Arterial 138.8 mmol/L      Potassium, Arterial 5.69 mmol/L      Ionized Calcium, Arterial 1.29 mmol/L      Chloride, Arterial 96 mmol/L      Glucose, Arterial 157 mg/dL      Lactate, Arterial 16.48 mmol/L      PO2/FIO2 160    POC Glucose Once [[id number]]  (Abnormal) Collected: 11/09/23 1621    Specimen: Blood Updated: 11/09/23 1622     Glucose 146 mg/dL      Comment: Serial Number: [id number]:  [id number]       High Sensitivity Troponin T 2Hr [[id number]]  (Abnormal) Collected: 11/09/23 1625    Specimen: Blood Updated: 11/09/23 1733     HS Troponin T 1,726 ng/L       Comment: Verified by repeat analysis.        Troponin T Delta -307 ng/L     Narrative:      High Sensitive Troponin T Reference Range:  <14.0 ng/L- Negative Female for AMI  <22.0 ng/L- Negative Male for AMI  >=14 - Abnormal Female indicating possible myocardial injury.  >=22 - Abnormal Male indicating possible myocardial injury.   Clinicians would have to utilize clinical acumen, EKG, Troponin, and serial changes to determine if it is an Acute Myocardial Infarction or myocardial injury due to an underlying chronic condition.         Comprehensive Metabolic Panel [[id number]]  (Abnormal) Collected: 11/09/23 1625    Specimen: Blood Updated: 11/09/23 1747     Glucose 187 mg/dL      BUN >112 mg/dL      Comment: Verified by repeat analysis.        Creatinine 6.11 mg/dL      Sodium 144 mmol/L      Potassium 5.6 mmol/L      Chloride 95 mmol/L      CO2 10.4 mmol/L      Calcium 8.3 mg/dL      Total Protein 3.3 g/dL      Albumin 1.5 g/dL      ALT (SGPT) 1,310 U/L      AST (SGOT) >7,000 U/L      Alkaline Phosphatase 76 U/L      Total Bilirubin 0.5 mg/dL      Globulin 1.8 gm/dL      A/G Ratio 0.8 g/dL      BUN/Creatinine Ratio --     Comment: Result not available due to interfering substances.        Anion Gap 38.6 mmol/L      eGFR 6.2 mL/min/1.73      Comment: <15 Indicative of kidney failure       Narrative:      GFR Normal >60  Chronic Kidney Disease <60  Kidney Failure <15    The GFR formula is only valid for adults with stable renal function between ages 18 and 70.             Imaging:    XR Chest 1 View    Result Date: 11/9/2023  PROCEDURE: XR CHEST 1 VW  COMPARISON: [facility], CT, CT CHEST WO CONTRAST DIAGNOSTIC, 11/09/2023, 15:46.  [facility], CR, XR CHEST 1 VW, 11/09/2023, 14:11.  INDICATIONS: Central Line Placement  FINDINGS:  There is a defibrillator paddle obscuring the right aspect of the chest.  There is an endotracheal tube in place with the tip terminating at the level of the  aortic knob.  The nasogastric tube tip projects out of field of view, but below the hemidiaphragms.  There has been placement of a right internal jugular line with the tip terminating the superior vena cava.  There is no pneumothorax.  There is no pleural effusion.  There are right perihilar densities likely due to subsegmental atelectasis.  The left lung is clear.  There are chronic age-related changes involving the bony thorax and thoracic aorta.        1. Lines and support tubes are in good position.  There is no pneumothorax. 2. Right perihilar densities felt to represent subsegmental atelectasis.       MARIA T MILNER MD       Electronically Signed and Approved By: MARIA T MILNER MD on 11/09/2023 at 16:48             CT Abdomen Pelvis Without Contrast    Result Date: 11/9/2023  PROCEDURE: CT ABDOMEN PELVIS WO CONTRAST  COMPARISON: None  INDICATIONS: post cardiac arrest, possible sepsis  TECHNIQUE: CT images were created without intravenous contrast.   PROTOCOL:   Standard imaging protocol performed    RADIATION:   DLP: 1430mGy*cm   Automated exposure control was utilized to minimize radiation dose.  FINDINGS:  There is free air with gas bubbles scattered throughout the anterior aspect of the abdomen and pelvis.  There is also small bowel pneumatosis.  The proximal mid small bowel are mildly dilated with wall thickening.  Diagnostic considerations would include and enteritis or ischemia.  The terminal ileum and distal small bowel are of reduced caliber.  There is diffuse colonic diverticulosis without evidence of acute diverticulitis.  There is mild ascites with most of the fluid adjacent to the liver and spleen.  There is also mild ascitic fluid in the lower pelvis superior to the urinary bladder.  The liver is cirrhotic.  The spleen size is normal.  There are several small calcified gallstones.  The adrenal glands and pancreas are normal.  There are hypodense and hyperdense round masses in the kidneys felt to represent  simple and complex renal cysts.  There is a Turner catheter in the urinary bladder which is decompressed.  The uterus is surgically absent.  There are atherosclerotic vascular calcifications throughout the abdomen and pelvis.  The abdominal aorta and common iliac arteries are mildly ectatic.  The lower chest is abnormal.  There is bilateral lower lobe airspace disease felt to represent a combination of atelectasis and pneumonia.  There are no suspicious osteolytic or sclerotic lesions in the bony structures.  There are degenerative changes within the spine, hip joints, and sacroiliac joints.  There are no suspicious osteolytic or sclerotic lesions.  CONTINUED ON NEXT PAGE...        1. Free air and small bowel pneumatosis.  The proximal and mid small bowel is mildly dilated with wall thickening.  This could be due to an enteritis or ischemic bowel.  This is likely the source for the perforation. 2. Diffuse colonic diverticulosis without evidence of acute diverticulitis. 3. Mild abdominal and pelvic ascites. 4. Cirrhotic liver. 5. Cholelithiasis. 6. Bilateral lower lobe airspace disease felt to represent a combination of atelectasis and pneumonia. 7. Additional findings as described above. 8. The abnormal results were discussed with Dr. Marely MD by telephone.     MARIA T MILNER MD       Electronically Signed and Approved By: MARIA T MILNER MD on 11/09/2023 at 16:14             CT Chest Without Contrast Diagnostic    Result Date: 11/9/2023  PROCEDURE: CT CHEST WO CONTRAST DIAGNOSTIC  COMPARISON: None  INDICATIONS: Resuscitated full arrest  TECHNIQUE: CT images were created without the administration of contrast material.   PROTOCOL:   Standard imaging protocol performed    RADIATION:   DLP: 348mGy*cm   Automated exposure control was utilized to minimize radiation dose.  FINDINGS:  Bones/soft tissues:  Multiple bilateral anterior angulated rib fractures, and displaced left anterior 5th rib fracture.  Nonacute appearing  moderate T8 wedge compression fracture  Mariana/mediastinum:  Endotracheal tube 3 cm above the genaro.  Normal caliber atherosclerotic thoracic aorta.  Coronary artery calcifications.  No pericardial effusion  Lungs/pleura:  No pneumothorax.  Airspace disease with endobronchial secretions in both lower lobes.  Multifocal ground-glass infiltrates in both upper lobes.  Mild interlobular septal thickening in the apices and bases.  No significant pleural effusion  Upper abdomen:  Nodular liver contour.  Small upper abdominal ascites.  Stones in the gallbladder.  Atrophic right kidney with 2.5 cm hyperattenuating lesion.  Gastric tube in the stomach         1. Multiple bilateral anterior rib fractures.  No pneumothorax  2. Airspace consolidation in the lower lobes may represent atelectasis, aspiration, or pneumonia.  3. Mild interlobular septal thickening in the apices and bases compatible with mild interstitial edema.  Ground-glass opacities in the upper lobes may also be edema, or pneumonia  4. Calcific coronary atherosclerosis  5. Cirrhosis with upper abdominal ascites  6. Cholelithiasis, right renal atrophy, and 2.5 cm hyperattenuating right renal lesion      TANISHA COUGHLIN MD       Electronically Signed and Approved By: TANISHA COUGHLIN MD on 11/09/2023 at 16:09             CT Head Without Contrast    Result Date: 11/9/2023  PROCEDURE: CT HEAD WO CONTRAST  COMPARISON:  None INDICATIONS: ams, intubated, post cardiac arrest  PROTOCOL:   Standard imaging protocol performed    RADIATION:   DLP: 954.8mGy*cm   MA and/or KV was adjusted to minimize radiation dose.     TECHNIQUE: After obtaining the patient's consent, CT images were obtained without non-ionic intravenous contrast material.  FINDINGS:  There is chronic appearing small infarct in the anterolateral right frontal lobe.  There is a small infarct in the superolateral left frontal parietal region measuring about 1.3 cm.  Mild to moderate low density in the cerebral  white matter is consistent with small vessel ischemic disease.  There is moderate diffuse cerebral and cerebellar atrophy.  The ventricles are normal in size and configuration given the degree of atrophy.  Severe atherosclerotic calcification is noted in the intracranial ICAs bilaterally.  No calvarial fracture is identified.         1. Small subacute to chronic cortical infarct in the superior left frontal parietal lobe. 2. Small chronic infarct in the anterolateral right frontal lobe 3. No intracranial hemorrhage 4. Mild-to-moderate small vessel ischemic changes in the white matter     Paco Jaquez M.D.       Electronically Signed and Approved By: Paco Jaquez M.D. on 11/09/2023 at 16:01             XR Chest 1 View    Result Date: 11/9/2023  PROCEDURE: XR CHEST 1 VW  COMPARISON: None  INDICATIONS: check et tube placement  FINDINGS:  The patient is intubated with the endotracheal tube above the genaro.  Nasoenteric tube passes into the stomach.  Cardiac size is normal for the projection.  The pulmonary vascular markings in the chest appear normal.  There is airspace disease in the left lung base posterior to the cardiac silhouette.        1. Tubes and lines in appropriate position 2. Left basilar airspace disease       Dennys Presley MD       Electronically Signed and Approved By: Dennys Presley MD on 11/09/2023 at 14:14                Differential Diagnosis and Discussion:    Abdominal Pain: Based on the patient's signs and symptoms, I considered abdominal aortic aneurysm, small bowel obstruction, pancreatitis, acute cholecystitis, acute appendecitis, peptic ulcer disease, gastritis, colitis, endocrine disorders, irritable bowel syndrome and other differential diagnosis an etiology of the patient's abdominal pain.  Cardiac Arrest: Differential diagnosis includes but is not limited to ventricular tachycardia, ventricular fibrillation, asystole, PEA, respiratory arrest due to hypoxia or metabolic  abnormalities.    All labs were reviewed and interpreted by me.  All X-rays impressions were independently interpreted by me.  EKG was interpreted by me.  CT scan radiology impression was interpreted by me.    MDM  Number of Diagnoses or Management Options  Bowel perforation  Cardiac arrest  Diagnosis management comments: Sepsis criteria was met in the emergency department and the Sepsis protocol (including antibiotic administration) was initiated.      SIRS criteria considered:   1.  Temperature > 100.4 or <98.6    2.  Heart Rate > 90    3.  Respiratory Rate > 22    4.  WBC > 12K or <4K.             Severe Sepsis:     Respiratory: Mechanical Ventilation or Bipap  Hypotension: SBP > 90 or MAP < 65  Renal: Creatinine > 2  Metabolic: Lactic Acid > 2  Hematologic: Platelets < 100K or INR > 1.5  Hepatic: BILI  >  2  CNS: Sudden AMS     Septic Shock:     Severe Sepsis + Persistent hypotension or Lactic Acid > 4     Normal saline bolus, Antibiotics, and final disposition was based on these definitions.        Sepsis was recognized at 13:58 EST      Antibiotics were ordered.     30 cc/kg bolus was  indicated.         The patient was ordered 2 L of fluids.    Total Critical Care time of 130 minutes. Total critical care time documented does not include time spent on separately billed procedures for services of nurses or physician assistants. I personally saw and examined the patient. I have reviewed all diagnostic interpretations and treatment plans as written. I was present for the key portions of any procedures performed and the inclusive time noted in any critical care statement. Critical care time includes patient management by me, time spent at the patients bedside,  time to review lab and imaging results, discussing patient care, documentation in the medical record, and time spent with family or caregiver.        Amount and/or Complexity of Data Reviewed  Clinical lab tests: reviewed  Tests in the radiology section of  CPT®: reviewed         Critical Care Note: Total Critical Care time of 130 minutes. Total critical care time documented does not include time spent on separately billed procedures for services of nurses or physician assistants. I personally saw and examined the patient. I have reviewed all diagnostic interpretations and treatment plans as written. I was present for the key portions of any procedures performed and the inclusive time noted in any critical care statement. Critical care time includes patient management by me, time spent at the patients bedside,  time to review lab and imaging results, discussing patient care, documentation in the medical record, and time spent with family or caregiver.    Patient Care Considerations:          Consultants/Shared Management Plan:    Consultant: I have discussed the case with Dr. Goldman who agrees to consult on the patient.  Consultant: I have discussed the case with Dr. Olguin who states he is happy to consult on this patient.    Social Determinants of Health:    Patient is independent, reliable, and has access to care.       Disposition and Care Coordination:    : The patient  in the emergency department despite resuscitative efforts. Time of Death:         Final diagnoses:   Cardiac arrest   Bowel perforation        ED Disposition       ED Disposition       Condition   --    Comment   --               This medical record created using voice recognition software.             Michel Yap MD  [phone]

## 2023-11-14 LAB
BACTERIA SPEC AEROBE CULT: NORMAL
BACTERIA SPEC AEROBE CULT: NORMAL
GLUCOSE BLDC GLUCOMTR-MCNC: 147 MG/DL (ref 70–99)
GLUCOSE BLDC GLUCOMTR-MCNC: 381 MG/DL (ref 70–99)